# Patient Record
Sex: MALE
[De-identification: names, ages, dates, MRNs, and addresses within clinical notes are randomized per-mention and may not be internally consistent; named-entity substitution may affect disease eponyms.]

---

## 2018-12-31 ENCOUNTER — HOSPITAL ENCOUNTER (EMERGENCY)
Dept: HOSPITAL 31 - C.ER | Age: 63
Discharge: HOME | End: 2018-12-31
Payer: SELF-PAY

## 2018-12-31 VITALS
HEART RATE: 68 BPM | RESPIRATION RATE: 18 BRPM | OXYGEN SATURATION: 96 % | DIASTOLIC BLOOD PRESSURE: 95 MMHG | SYSTOLIC BLOOD PRESSURE: 155 MMHG

## 2018-12-31 VITALS — TEMPERATURE: 97.9 F

## 2018-12-31 DIAGNOSIS — M54.40: Primary | ICD-10-CM

## 2018-12-31 DIAGNOSIS — E78.00: ICD-10-CM

## 2018-12-31 NOTE — C.PDOC
History Of Present Illness


63 year old male presents to the emergency department with complaints of lower 

back pain radiating down his right leg which started yesterday. Patient denies 

injury, bowel/bladder incontinence, and saddle anesthesia. Patient reports 

taking Motrin yesterday with some relief of symptoms. 


Chief Complaint (Nursing): Back Pain


History Per: Patient


History/Exam Limitations: no limitations


Onset/Duration Of Symptoms: Days (1)


Current Symptoms Are (Timing): Still Present


Quality Of Discomfort: "Pain"


Associated Symptoms: denies: Incontinence, New Weakness, New Numbness





Past Medical History


Reviewed: Historical Data, Nursing Documentation, Vital Signs


Vital Signs: 





                                Last Vital Signs











Temp  97.9 F   12/31/18 09:00


 


Pulse  68   12/31/18 10:17


 


Resp  18   12/31/18 10:17


 


BP  155/95 H  12/31/18 10:17


 


Pulse Ox  96   12/31/18 10:17














- Medical History


PMH: Asthma, COPD, Hypercholesterolemia


Surgical History: No Surg Hx





- [facility] Procedures











ENDOSC POLYPECTOMY OF LG INTEST (11/19/03)








Family History: States: No Known Family Hx





- Social History


Hx Alcohol Use: No


Hx Substance Use: No





- Immunization History


Hx Tetanus Toxoid Vaccination: No


Hx Influenza Vaccination: No


Hx Pneumococcal Vaccination: No





Review Of Systems


Except As Marked, All Systems Reviewed And Found Negative.


Constitutional: Negative for: Fever, Chills


Gastrointestinal: Negative for: Nausea, Vomiting, Abdominal Pain, Diarrhea


Genitourinary: Negative for: Dysuria, Frequency, Incontinence


Musculoskeletal: Positive for: Back Pain, Leg Pain


Neurological: Negative for: Weakness, Numbness





Physical Exam





- Physical Exam


Appears: Well, Non-toxic, No Acute Distress


Skin: Normal Color, Warm, Dry


Head: Atraumatic, Normacephalic


Eye(s): bilateral: Normal Inspection, PERRL, EOMI


Neck: Normal, Supple


Chest: Symmetrical, No Tenderness


Back: Straight Leg Raising (positive straight leg raise test on right leg)


Extremity: Normal ROM (all extremities)


Neurological/Psych: Oriented x3, Normal Speech





ED Course And Treatment


O2 Sat by Pulse Oximetry: 96 (RA)


Pulse Ox Interpretation: Normal





Medical Decision Making


Medical Decision Making: 


Plan:


Flexeril 10mg PO


Percocet 1tab PO





Disposition





- Disposition


Referrals: 


[facility] [Outside]


[facility] [Outside]


Disposition: HOME/ ROUTINE


Disposition Time: 09:50


Condition: GOOD


Additional Instructions: 





ANSELMO VALENZUELA, thank you for letting us take care of you today. Your provider 

was Tony Cantu DO and you were treated for BACK PAIN/LEG PAIN. The [facility] medical care you received today was directed at your acute symptoms. If 

you were prescribed any medication, please fill it and take as directed. It may 

take several days for your symptoms to resolve. Return to the Emergency 

Department if your symptoms worsen, do not improve, or if you have any other 

problems.





Please contact your doctor or call one of the physicians/clinics you have been 

referred to that are listed on the Patient Visit Information form that is 

included in your discharge packet. Bring any paperwork you were given at 

discharge with you along with any medications you are taking to your follow up 

visit. Our treatment cannot replace ongoing medical care by a primary care 

provider outside of the emergency department.





Thank you for allowing the [facility] team to be part of your care today.











For mild pain, you can take motrin or tylenol.





For severe pain, you can use the oxycontin.

















Follow up with the clinic this week.








Prescriptions: 


Cyclobenzaprine [Cyclobenzaprine HCl] 10 mg PO Q8 PRN #20 tab


 PRN Reason: Muscle Spasm


oxyCODONE [oxyCODONE Immediate Release Tab] 5 mg PO Q6 PRN #15 tab


 PRN Reason: Pain, Severe (8-10)


Instructions:  Low Back Pain  (DC), Sciatica (DC)


Forms:  [facility] (English)





- Clinical Impression


Clinical Impression: 


 Sciatica, Low back pain








- Scribe Statement


The provider has reviewed the documentation as recorded by the Scribe (Davie Pollard)


Provider Attestation: 


All medical record entries made by the Scribe were at my direction and 

personally dictated by me. I have reviewed the chart and agree that the record 

accurately reflects my personal performance of the history, physical exam, 

medical decision making, and the department course for this patient. I have also

personally directed, reviewed, and agree with the discharge instructions and 

disposition.

## 2019-01-04 ENCOUNTER — HOSPITAL ENCOUNTER (EMERGENCY)
Dept: HOSPITAL 31 - C.ER | Age: 64
Discharge: HOME | End: 2019-01-04
Payer: SELF-PAY

## 2019-01-04 VITALS — RESPIRATION RATE: 16 BRPM | DIASTOLIC BLOOD PRESSURE: 85 MMHG | HEART RATE: 84 BPM | SYSTOLIC BLOOD PRESSURE: 128 MMHG

## 2019-01-04 VITALS — TEMPERATURE: 98 F

## 2019-01-04 VITALS — BODY MASS INDEX: 38 KG/M2

## 2019-01-04 VITALS — OXYGEN SATURATION: 95 %

## 2019-01-04 DIAGNOSIS — M54.30: ICD-10-CM

## 2019-01-04 DIAGNOSIS — E78.00: ICD-10-CM
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
## 2019-01-04 DIAGNOSIS — S39.012A: Primary | ICD-10-CM

## 2019-01-04 NOTE — C.PDOC
History Of Present Illness


63 year old male presents to the emergency department with complaints of pain to

the right buttock radiating down his right leg for the last two weeks. Patient 

was previously evaluated at [facility], was given pain medications and Prednisone. 

Patient returns today stating that the pain is still there, and states that he 

is unable to go to work as a result. He denies fever, chills, nausea, and 

vomiting. 


Time Seen by Provider: 01/04/19 09:45


Chief Complaint (Nursing): Back Pain


History Per: Patient


History/Exam Limitations: no limitations


Onset/Duration Of Symptoms: Other (two weeks)


Quality Of Discomfort: "Pain"


Associated Symptoms: None





Past Medical History


Reviewed: Historical Data, Nursing Documentation, Vital Signs


Vital Signs: 





                                Last Vital Signs











Temp  98.0 F   01/04/19 09:45


 


Pulse  97 H  01/04/19 09:45


 


Resp  20   01/04/19 09:45


 


BP  129/87   01/04/19 09:45


 


Pulse Ox  95   01/04/19 09:45














- Medical History


PMH: Asthma, COPD, Hypercholesterolemia (pt denies)


Surgical History: No Surg Hx





- [facility] Procedures











ENDOSC POLYPECTOMY OF LG INTEST (11/19/03)








Family History: States: No Known Family Hx





- Social History


Hx Alcohol Use: No


Hx Substance Use: No





- Immunization History


Hx Tetanus Toxoid Vaccination: No


Hx Influenza Vaccination: No


Hx Pneumococcal Vaccination: No





Review Of Systems


Except As Marked, All Systems Reviewed And Found Negative.


Constitutional: Negative for: Fever, Chills


Gastrointestinal: Negative for: Nausea, Vomiting


Musculoskeletal: Positive for: Leg Pain (right buttock radiating down right leg)





Physical Exam





- Physical Exam


Appears: Non-toxic, In Acute Distress (visibly in pain), Other (walking with a 

limp)


Skin: Normal Color, Warm, Dry


Head: Atraumatic, Normacephalic


Eye(s): bilateral: Normal Inspection, PERRL, EOMI


Neck: Normal, Supple


Chest: Symmetrical, No Tenderness


Extremity: Tenderness (tenderness to palpation on right buttock)


Extremity: Bilateral: Atraumatic, Normal Color And Temperature


Pulses: Left Dorsalis Pedis: Normal, Right Dorsalis Pedis: Normal


Neurological/Psych: Oriented x3, Normal Speech


Gait: Other (difficulty ambulating due to pain)





ED Course And Treatment


O2 Sat by Pulse Oximetry: 95 (RA)


Pulse Ox Interpretation: Normal





Medical Decision Making


Medical Decision Making: 


Plan:


Tylenol 975mg PO


Motrin 600mg PO


Lidoderm Patch








Disposition


Counseled Patient/Family Regarding: Diagnosis, Need For Followup, Rx Given





- Disposition


Referrals: 


[facility] [Outside]


Disposition: HOME/ ROUTINE


Disposition Time: 11:00


Condition: STABLE


Prescriptions: 


Ibuprofen [Motrin] 600 mg PO TID #15 tab


traMADol/Acetaminophen [Ultracet 37.5/325 mg] 1 tab PO HS PRN #12 tab


 PRN Reason: pain


Instructions:  Sciatica Exercises, Sciatica (DC)


Forms:  [facility] (Iranian), Gen Discharge Inst Iranian, Work Excuse





- POA


Present On Arrival: None





- Clinical Impression


Clinical Impression: 


 Sciatica, Low back strain








- Scribe Statement


The provider has reviewed the documentation as recorded by the Scribe (Davie Pollard)


Provider Attestation: 


All medical record entries made by the Scribe were at my direction and 

personally dictated by me. I have reviewed the chart and agree that the record 

accurately reflects my personal performance of the history, physical exam, 

medical decision making, and the department course for this patient. I have also

personally directed, reviewed, and agree with the discharge instructions and 

disposition.